# Patient Record
Sex: MALE | Race: WHITE | Employment: FULL TIME | URBAN - METROPOLITAN AREA
[De-identification: names, ages, dates, MRNs, and addresses within clinical notes are randomized per-mention and may not be internally consistent; named-entity substitution may affect disease eponyms.]

---

## 2023-10-17 ENCOUNTER — OFFICE VISIT (OUTPATIENT)
Dept: ORTHOPEDIC SURGERY | Age: 48
End: 2023-10-17

## 2023-10-17 VITALS — WEIGHT: 220 LBS | BODY MASS INDEX: 28.23 KG/M2 | HEIGHT: 74 IN

## 2023-10-17 DIAGNOSIS — M54.9 BACK PAIN, UNSPECIFIED BACK LOCATION, UNSPECIFIED BACK PAIN LATERALITY, UNSPECIFIED CHRONICITY: Primary | ICD-10-CM

## 2023-10-17 RX ORDER — ESCITALOPRAM OXALATE 10 MG/1
10 TABLET ORAL DAILY
COMMUNITY
Start: 2023-10-06

## 2023-10-17 RX ORDER — LISINOPRIL 10 MG/1
1 TABLET ORAL
Qty: 30 TABLET | Refills: 2 | COMMUNITY
Start: 2023-09-07 | End: 2023-12-06

## 2023-10-17 RX ORDER — PREDNISONE 20 MG/1
TABLET ORAL
COMMUNITY
Start: 2023-10-12

## 2023-10-17 RX ORDER — CYCLOBENZAPRINE HCL 10 MG
1 TABLET ORAL NIGHTLY PRN
COMMUNITY
Start: 2023-10-05 | End: 2023-11-04

## 2023-10-17 NOTE — PROGRESS NOTES
Name: Adrián Naranjo  YOB: 1975  Gender: male  MRN: [id number]  Age: 41 y.o. Chief Complaint: Back pain and left leg pain    History of Present Illness: This is a very pleasant 41 y.o. male who presents with a acute on chronic history of back pain now with a 3-week history of left leg pain. This was acutely exacerbated 3 weeks ago when he was lifting up a vanity. He did get a steroid shot in his shoulder as well as been on a Medrol Dosepak which is improving his symptoms. He is also taking Flexeril. He has not done any physical therapy. Pain is worse with standing and bending. Back Pain: 90%  Leg Pain: 10%    Conservative treatment tried:   Physical Thereapy: No   NSAIDS: Yes   Medrol Dose Pack: Yes   Injections: No recent KRYSTAL's   Other Modalities: Chiropractor    Medications:       Current Outpatient Medications:     escitalopram (LEXAPRO) 10 MG tablet, Take 1 tablet by mouth daily, Disp: , Rfl:     lisinopril (PRINIVIL;ZESTRIL) 10 MG tablet, Take 1 tablet by mouth Every Day, Disp: 30 tablet, Rfl: 2    predniSONE (DELTASONE) 20 MG tablet, 1 tablet Orally Once a day for 7 days, Disp: , Rfl:     cyclobenzaprine (FLEXERIL) 10 MG tablet, Take 1 tablet by mouth nightly as needed, Disp: , Rfl:     Allergies:    No Known Allergies      Physical Exam:     This is a well developed well nourished male adult in no acute distress. Mood and affect are appropriate. Oriented to person, place, and time. Respirations are unlabored and there is no evidence of cyanosis    The patient ambulates with an antalgic gait.          Sensory testing:     L2 L3 L4 L5 S1 S2   Right 2 2 2 2 2 2   Left 2 1 2 2 2 2     0=absent; 1=altered; 2=normal; NT= not tested  Reflexes    Reflexes   Right Left   Quadriceps (L4) 2 2   Achilles (S1) 2 2     Strength testing in the lower extremity reveals the following based on the 5 point grading scale:     HF  (L2) KE (L3/4) ADF (L4) EHL (L5) APF (S1)   Right 5 5 Initial (On Arrival)